# Patient Record
Sex: MALE | Race: BLACK OR AFRICAN AMERICAN | NOT HISPANIC OR LATINO | Employment: FULL TIME | URBAN - METROPOLITAN AREA
[De-identification: names, ages, dates, MRNs, and addresses within clinical notes are randomized per-mention and may not be internally consistent; named-entity substitution may affect disease eponyms.]

---

## 2020-04-21 DIAGNOSIS — Z01.84 ANTIBODY RESPONSE EXAMINATION: ICD-10-CM

## 2020-05-21 DIAGNOSIS — Z01.84 ANTIBODY RESPONSE EXAMINATION: ICD-10-CM

## 2020-06-20 DIAGNOSIS — Z01.84 ANTIBODY RESPONSE EXAMINATION: ICD-10-CM

## 2020-07-20 DIAGNOSIS — Z01.84 ANTIBODY RESPONSE EXAMINATION: ICD-10-CM

## 2020-08-19 DIAGNOSIS — Z01.84 ANTIBODY RESPONSE EXAMINATION: ICD-10-CM

## 2020-09-18 DIAGNOSIS — Z01.84 ANTIBODY RESPONSE EXAMINATION: ICD-10-CM

## 2020-10-18 DIAGNOSIS — Z01.84 ANTIBODY RESPONSE EXAMINATION: ICD-10-CM

## 2020-11-17 DIAGNOSIS — Z01.84 ANTIBODY RESPONSE EXAMINATION: ICD-10-CM

## 2023-04-06 ENCOUNTER — TELEPHONE (OUTPATIENT)
Dept: NEUROLOGY | Facility: CLINIC | Age: 38
End: 2023-04-06
Payer: OTHER GOVERNMENT

## 2023-04-06 NOTE — TELEPHONE ENCOUNTER
----- Message from Gail Balderas sent at 4/6/2023  9:56 AM CDT -----  Regarding: [place] Location  Contact: @[phone]  Patient's rep from [facility] is trying to get appt at [facility] Address: [address] [place] [address] [place] [address]. I can't find a way to get in touch or schedule. Please call and advise. Patient has chronic migraines and [employer] insurance.    Please call > @[phone]

## 2023-04-06 NOTE — TELEPHONE ENCOUNTER
Called [facility], advised to call the [facility] Neurology department at .

## 2023-10-31 ENCOUNTER — OFFICE VISIT (OUTPATIENT)
Dept: NEUROLOGY | Facility: CLINIC | Age: 38
End: 2023-10-31
Payer: OTHER GOVERNMENT

## 2023-10-31 ENCOUNTER — PATIENT MESSAGE (OUTPATIENT)
Dept: NEUROLOGY | Facility: CLINIC | Age: 38
End: 2023-10-31

## 2023-10-31 VITALS
DIASTOLIC BLOOD PRESSURE: 94 MMHG | WEIGHT: 213 LBS | HEIGHT: 69 IN | BODY MASS INDEX: 31.55 KG/M2 | SYSTOLIC BLOOD PRESSURE: 141 MMHG | HEART RATE: 68 BPM

## 2023-10-31 DIAGNOSIS — G43.709 CHRONIC MIGRAINE WITHOUT AURA, NOT INTRACTABLE, WITHOUT STATUS MIGRAINOSUS: Primary | ICD-10-CM

## 2023-10-31 DIAGNOSIS — G44.40 MEDICATION OVERUSE HEADACHE: ICD-10-CM

## 2023-10-31 PROBLEM — G47.30 SLEEP APNEA: Status: ACTIVE | Noted: 2023-10-31

## 2023-10-31 PROBLEM — H90.3 BILATERAL SENSORINEURAL HEARING LOSS: Status: ACTIVE | Noted: 2023-10-31

## 2023-10-31 PROBLEM — H91.90 HEARING LOSS: Status: ACTIVE | Noted: 2023-10-31

## 2023-10-31 PROBLEM — E55.9 VITAMIN D DEFICIENCY: Status: ACTIVE | Noted: 2023-10-31

## 2023-10-31 PROBLEM — M24.576 CONTRACTURE OF JOINT OF FOOT: Status: ACTIVE | Noted: 2023-10-31

## 2023-10-31 PROBLEM — H52.229 REGULAR ASTIGMATISM: Status: ACTIVE | Noted: 2023-10-31

## 2023-10-31 PROBLEM — F10.20 ALCOHOL DEPENDENCE, UNCOMPLICATED: Status: ACTIVE | Noted: 2023-10-31

## 2023-10-31 PROCEDURE — 99213 OFFICE O/P EST LOW 20 MIN: CPT | Mod: PBBFAC,PN | Performed by: PSYCHIATRY & NEUROLOGY

## 2023-10-31 PROCEDURE — 99205 PR OFFICE/OUTPT VISIT, NEW, LEVL V, 60-74 MIN: ICD-10-PCS | Mod: S$PBB,,, | Performed by: PSYCHIATRY & NEUROLOGY

## 2023-10-31 PROCEDURE — 99999 PR PBB SHADOW E&M-EST. PATIENT-LVL III: ICD-10-PCS | Mod: PBBFAC,,, | Performed by: PSYCHIATRY & NEUROLOGY

## 2023-10-31 PROCEDURE — 99205 OFFICE O/P NEW HI 60 MIN: CPT | Mod: S$PBB,,, | Performed by: PSYCHIATRY & NEUROLOGY

## 2023-10-31 PROCEDURE — 99999 PR PBB SHADOW E&M-EST. PATIENT-LVL III: CPT | Mod: PBBFAC,,, | Performed by: PSYCHIATRY & NEUROLOGY

## 2023-10-31 RX ORDER — DIVALPROEX SODIUM 250 MG/1
250 TABLET, DELAYED RELEASE ORAL 2 TIMES DAILY
COMMUNITY

## 2023-10-31 RX ORDER — LORAZEPAM 1 MG/1
1 TABLET ORAL 2 TIMES DAILY
COMMUNITY
End: 2023-10-31

## 2023-10-31 RX ORDER — RIZATRIPTAN BENZOATE 10 MG/1
10 TABLET ORAL
COMMUNITY
End: 2023-10-31

## 2023-10-31 RX ORDER — RIZATRIPTAN BENZOATE 10 MG/1
10 TABLET, ORALLY DISINTEGRATING ORAL
Qty: 12 TABLET | Refills: 11 | Status: SHIPPED | OUTPATIENT
Start: 2023-10-31 | End: 2023-11-30

## 2023-10-31 NOTE — PROGRESS NOTES
[facility] HEADACHE [facility]IC[facility]    CHIEF COMPLAINT?   Mr. Dexter Jaramillo III chief concern is uncontrolled headache.     Name of the referring physician [facility]*     Name of primary care provider No, Primary Doctor      Subjective:    HPI:  Mr. Dexter Jaramillo III presents today to discuss their ongoing uncontrolled headaches.     Age of onset: 25 YO  When did they become more of a problem: 25 YO  # of Total headache days per month: 20  # of Migraine or severe days per month: 15  This rate has been present >3 months: yes  Intensity of average and most severe headaches: 3-4/10, 10/10  Duration of headache pain: >4 hrs untreated  Quality of pain: Pulsating/Throbbing  Laterality: unilateral, not side locked, sometimes bilateral   Location: can be anywhere  Photophobia and phonophobia: yes  Nausea and vomiting: yes  Causes avoidance of physical activity: yes  Worsened by physical activity: yes  Aura: no  Autonomic symptoms: no  Family Hx of migraines: no  Supplements: none  Have qualities and features been stable for >2 years: YES    Alcohol intake: liquor 2-3 drinks per day    Preventive Medications failed: depakote (>3 months, mildly effective, insufficient)    Acute medications failed: rizatriptan (ineffective, always has to repeat, inconsistent help)    OTC Medications: aleve, ibuprofen (about 20 days per month)  Is medication overuse contributing to the patient's current migraine burden: YES    Hx of (Bolded items are positive): valvular heart disease (unspecified, per referral documents, hx of abnormal ekg), depression, PTSD, anxiety, fibromyalgia, autoimmune disorder, head trauma, neurological infection, glaucoma, asthma, nephrolithiasis, MI, Stroke, Raynaud's phen., KIMBERLY (he was told it was not bad enough for CPAP?)    Current HA Regimen:  Depakote 250 mg bid  Rizatriptan 10 mg     LATEST IMAGIN2022 MRI Brain w/o report provided by the patient on his phone. [facility]  "Normal study.    PAST MEDICAL HISTORY:  History reviewed. No pertinent past medical history.  Patient Active Problem List   Diagnosis    Bilateral sensorineural hearing loss    Alcohol dependence, uncomplicated    Chronic migraine without aura, not intractable, without status migrainosus    Contracture of joint of foot    Hearing loss    Sleep apnea    Regular astigmatism    Vitamin D deficiency     PAST SURGICAL HISTORY:  History reviewed. No pertinent surgical history.    CURRENT MEDS:  Current Outpatient Medications   Medication Sig Dispense Refill    divalproex (DEPAKOTE) 250 MG EC tablet Take 250 mg by mouth 2 (two) times daily.      rizatriptan (MAXALT) 10 MG tablet Take 10 mg by mouth every 2 (two) hours as needed for Migraine. Max 20mg/24hrs       No current facility-administered medications for this visit.     ALLERGIES:  Review of patient's allergies indicates:  No Known Allergies    FAMILY HISTORY:  History reviewed. No pertinent family history.    SOCIAL HISTORY:  Social History     Tobacco Use    Smoking status: Never    Smokeless tobacco: Never     Review of Systems:  Gen: no fever, no chills, no generalized feeling of weakness   HEENT: no double vision, no blurred vision, no eye pain, no eye exudates.    Heart: no chest pain, no SOB    Lungs: no SOB, no cough    MSK: no weakness of legs, intact ROM    ABD: no abd pain, no N/V/D/C, no difficulty with defecation .    Extremities: No leg pain, no edema.    Objective:  PHYSICAL EXAMINATION??   Dexter Jaramillo III is a 38 y.o. male patient who has the following vital signs with   Vitals:    10/31/23 0937   BP: (!) 141/94   Pulse: 68   Weight: 96.6 kg (213 lb)   Height: 5' 9" (1.753 m)   , body surface area is 2.17 meters squared.     GENERAL: Pleasant, well-appearing in no acute distress.     HEENT: Head normorcephalic, atraumatic.     PULMONARY: Breathing comfortably on room air.    NEURO:   Mental status: Awake, alert, and oriented to person, place, time, " and situation. Speech clear, fluent.     Cranial nerves:     III/IV/VI: EOMI. No nystagmus.     VII: Facial expressions full and symmetric.     VIII: Hearing intact to voice.     XII: Tongue protrusion midline.     Motor exam: 5/5 throughout    Reflexes:  2/4 throughout    Gait:  Normal stride length, normal arm swing    REVIEW   We reviewed their current medical history, medications, allergies, past medical history, surgical history, social history, family history, and review of systems, and updated all the information.     ASSESSMENT AND PLAN   Earnest? is a very pleasant, 38 y.o. with complicated diagnosis of     1. Chronic migraine without aura, not intractable, without status migrainosus  rizatriptan (MAXALT-MLT) 10 MG disintegrating tablet    erenumab-aooe (AIMOVIG) 140 mg/mL autoinjector      2. Medication overuse headache          Earnest has worsening headaches that meet International Headache Society Criteria for chronic migraine without aura.    Medication overuse is likely contributing to this patient's headaches.     Patient feels not only severe disabling pain, but is also unable to function due to headaches when they are present.     My approach to every patient is multimodal.   I focused our discussion on addressing modifiable risk factors and trying to decrease them. After discussion with the patient, I recommend the followin. Preventive medications:  Failed:   Topiramate - contraindicated due to hx of severe depression and PTSD  Beta blockers- not recommeded due to hx of valvular hear disease unless prescribed through cardiology  Depakote- greater than 3 months, inadequate response, unable to continue increasing dose due to history of alcoholism; prescribed partly for mood stabilization    START: Aimovig 140 mg every 28 days    CONTINUE: Depakote 250 mg BID as prescribed by outside provider as this likely provide some level of headache coverage as well     2. Acute (abortive) medications-  these medications are to be taken only at the onset of severe headache, and please limit the total of any combination of these medications to less than 10 days per month on average because they can cause medication overuse headaches.   Failed:   None    START: Rizatriptan ODT instead of pill  RIZATRIPTAN (MAXALT) is used as migraine abortive used only when you feel a migraine coming. Use 10 mg at the onset of migraine; take another 10 mg in 2 hours if headache pain remains. Rizatriptan is more effective if taken with 2 Aleve blue capsules (Naproxen 440mg).   Do NOT take Maxalt more than 6-8 days per month as this can cause medication overuse headache.   Do NOT take Maxalt within 24 hours of taking any other triptan or within 24 hours of taking any ergotamine medication such as DHE or Migranal.    Pregnancy and Breastfeeding: FDA pregnancy category C. This means that it is not known whether this drug will harm an unborn baby.    It is not known whether this medication passes into breast milk.    Potential side effects:   Check with your doctor right away if any of the following side effects continue for more than 1 hour. Even if they go away in less than 1 hour, check with your doctor before using any more sumatriptan if any of the following side effects occur: chest pain (mild), heaviness, tightness, or pressure in chest and/or neck ,difficulty in swallowing ,pounding heartbeat , rash, hives, itching, or bumps on skin     Medication overuse    When patients who have frequent, disabling headaches take medications to relieve their symptoms, they run the risk that the attacks will increase in frequency to daily or near-daily as a rebound effect comes into play. This pattern, called medication overuse headache, is more likely to happen with certain medications in patients who are susceptible to having severe headaches.    Breaking the cycle involves:  1.  weaning Earnest Clint III from the overused medications   2.   "setting up a preventive regimen   3.  setting strict limits on the use of medications to relieve acute symptoms     Tyrone Starr, and Susan Starr. "Breaking the cycle of medication overuse headache." [facility] Journal of Medicine 77.4 (2010): 236-242.     3. Natural approaches to increasing brain energy and serotonin:    SAMe 200 mg a day    Vitamin B2 400 mg daily    Vitamin B12 1000 mcg daily    Getting early morning light to increase natural serotonin, melatonin. Could also consider light therapy box, 10,000 LUX.     4. Headache prevention and acute treatment over-the counter supplements    Boswellia 800 mg 2-3 times per day, example brand Marcelo's, natural anti inflammatory herb    Sleep? modulation- melatonin at night 5 to 10 mg 30 minutes prior to sleep    Feverfew Tea 1-3 cups per day. Can get from [employer] or [email or web address]- A Natural anti inflammatory approach that does not cause further sensitization of the system.    Magnesium Citrate 250 mg daily, slowly increase up to 500-1000 mg daily. Side effect may include diarrhea, so we recommend stopping at whatever dose is comfortable for your body without causing this side effect. This supplement can be very helpful for preventing headache, preventing migraine aura, calming nerves, muscles and even mood. Recommend starting slowly, as it can also lead to increased bowel movements or diarrhea.?     5. DIET: I recommend a diet that heavily favors fruits and vegetables while reducing carbs. More information is available upon request.     6. EXERCISE: Gentle movement exercises, concentrate on combination of muscle building and aerobic. I also recommend adding gentle Yoga therapy. The goal is 150 minutes per week of moderate to rigorous exercise, but you should start at your own pace and progress slowly and safely as discussed today.    7. ANXIETY/STRESS- Control stressors with yoga, meditation, counseling, or other methods of mindfulness. "     8. SLEEP- aim for 7-8 hrs of restful sleep per night.     9. Reduce/discontinue alcohol consumption    10. Start daily multivitamin     Benefits, side effects, and alternatives were discussed extensively with the patient.?     Earnest verbally endorsed understanding of all the recommendations.?     he is going to follow up with him in 6 weeks or sooner as needed?     I spent a total of 60 minutes on the day of the visit. This includes face to face time and non-face to face time preparing to see the patient (eg, review of tests), obtaining and/or reviewing separately obtained history, documenting clinical information in the electronic or other health record, independently interpreting results and communicating results to the patient/family/caregiver, or care coordinator.    A dictation device was used to produce this documentation which can sometimes result in grammatical errors or the replacement of similar sounding words.    Damon Alonzo, DO  Headache Medicine